# Patient Record
(demographics unavailable — no encounter records)

---

## 2025-03-28 NOTE — PLAN
[Family Questions] : Family's questions were addressed [Sleep] : The importance of sleep and strategies to ensure adequate sleep

## 2025-03-28 NOTE — SOCIAL HISTORY
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

## 2025-03-28 NOTE — HISTORY OF PRESENT ILLNESS
[Public] : Public [Gen Ed: _____] : General Education class [unfilled] [Easily distracted] : easily distracted [Instructions often need to be repeated several times] : instructions often need to be repeated several times [Restless, fidgety] : restless, fidgety [Disruptive in class] : disruptive in class [Calls out in class, doesn't raise hand] : calls out in class, doesn't raise hand [Impatient, has trouble waiting for turn] : impatient, has trouble waiting for turn [Reacts physically when upset] : reacts physically when upset [Difficulty with transitions] : difficulty with transitions [Disrespectful, talks back to adults] : disrespectful, talks back to adults [Behavior difficulties at school and home] : behavior difficulties at school and home [Does well academically] : does well academically [Gets along well with other children] : gets along well with other children [Difficult to discipline] : difficult to discipline [Difficulty focusing in class] : no difficulty focusing in class [Needs frequent redirection to finish tasks] : does not  need frequent redirection to finish tasks [Difficulty completing homework, needs supervision] : no difficulties completing homework, does not need supervision [Difficulty following the daily routines at home] : no difficulties following the daily routines at home [Often loses belongings, misplaces books/assignments] : does not often lose belongings, misplace books and/or assignments  [Difficulty sitting still in class] : no difficulties sitting still in class [Always "on the go"] : not always "on the go" [Easily frustrated] : not easily frustrated [Runs Off] : does not run off [Oppositional] : not oppositional [Has frequent temper tantrums] : does not have frequent temper tantrums [Has hit other children] : has not hit other children [Physically aggressive] : not physically aggressive [Doesn't stay with the group during [place] time] : does stays with the group during [place] time [Difficulty with reading] : no difficulties with reading [Difficulty with math] : no difficulties with math [Handwriting is sloppy or illegible] : handwriting is neat and legible [Trouble expressing thoughts in writing] : no trouble expressing thoughts in writing [Reads well, but has trouble with comprehension] : reads well and comprehends [Is very sensitive, upset easily] : is not very sensitive, does not upset easily [Seems sad often] : does not seem sad often [Keita] : not keita [Seems nervous] : does not seem nervous [Worries about school performance] : does not worry about school performance [Worries what other children think] : does not worry about what other children think [Afraid to speak in front of class] : is not afraid to speak in front of class [Delayed Speech] : no delayed speech [Delays in motor skills] : no delays in motor skills [Difficulty with sleep] : no difficulties with sleep [Picky eater, eats a limited range of food] : not a picky eater, does not eat a very limited range of food [Plays repetitively, has limited interest] : does not play repetitively, does not have limited interests [Flaps hands] : does not flap hands [Becomes fixated on specific topics or interests for a period of time] : does not become fixated on specific topics or interest for a period of time [Insists on things being the same] : does not insist on things being the same [Gets upset with changes in routines] : does not get upset with changes in routines [Gets upset with loud sounds] : does not get upset with loud sounds [Sensitive to texture, only wear certain clothes] : not sensitive to texture, will not only wear certain clothes [Difficulty with bathing] : no difficulties with bathing [difficulty with brushing teeth] : no difficulties with brushing teeth [Difficulty with haircuts] :  no difficulties with haircuts [Difficulty with Toilet training] : no difficulties with toilet training [FreeTextEntry2] : Sometimes he has difficulty focusing in the class. Sometimes he has difficulty following daily routines at home but not often. [FreeTextEntry5] : Sometimes he talks disrespectfully back to adults  sometimes. Sometimes there are behavior difficulties at home and at school. [FreeTextEntry4] : [employer] [TWNoteComboBox1] : 7th Grade

## 2025-03-28 NOTE — REASON FOR VISIT
[Initial Consultation] : an initial consultation for [Hyperactivity] : hyperactivity [Other: ____] : [unfilled] [Mother] : mother [Behavior Problems] : behavior problems [FreeTextEntry2] : Mother believes that he is very talking, impulsive and does not know when to stop. Even his teachers complain that  he talks during lessons and  stop. His behaviors tend to become disruptive in the class. He tends to be disrespective to his teachers. He is currently on a daily behavior monitoring sheet and his mother does not like the daily comments that the teachers make. He is  very smart and intelligent but acts very impulsve. Recently he has been controlling himself but but she is afraid that he may revert back to his prior behaviors, that is the reason his mother brought him in.

## 2025-05-08 NOTE — ASSESSMENT
[FreeTextEntry1] : 12-year-old male with right fibula fracture, possible avulsion fracture of the lateral malleolus.  Patient was placed in a short leg cast and we discussed cast care and elevation.  He will follow-up in 2 weeks for reassessment is aware he needs to refrain from gym or sports.  He will use crutches and father is aware if there is any issue he contact the office.  If he is in pain he will use over-the-counter medication as needed.

## 2025-05-08 NOTE — HISTORY OF PRESENT ILLNESS
[de-identified] : Amanuel is a 12 year old male who presents to the walk in accompanied by his father for evaluation of right leg pain status post injury that occurred 9 days ago.  He states he was playing football when he went up for a pass and was hit in the leg causing him to land awkwardly on his foot.  He felt excruciating pain however as the days went on the pain got better.  He did notice some swelling and then the pain flared up again yesterday where he felt it in both his right ankle and his shin area.  He went to urgent care today where x-rays were taken and revealed a possible avulsion fracture of the lateral malleolus as well as a mildly angulated fibula fracture.  He was placed in a splint and advised to follow-up.  He states he really has not that much pain except if he puts weight on it.  He has not taken any medication.

## 2025-05-08 NOTE — IMAGING
[de-identified] : Right ankle: Mild tenderness palpation over the lateral malleolus, pain with dorsiflexion plantarflexion, no tenderness over the ATFL or PTFL, no ecchymosis noted, neurovascular intact, negative Dinh test.  Right tib-fib: Positive tenderness over the fibula, positive edema noted at the lateral aspect, neurovascularly intact.

## 2025-06-02 NOTE — DATA REVIEWED
[Acceptable Fracture Allignment] : fracture alignment remains acceptable [de-identified] :  3 views of right ankle reviewed.

## 2025-06-02 NOTE — ASSESSMENT
[FreeTextEntry1] : Continue cast. Follow up with AILYN Weston or Helen in 3 weeks for cast off and slow return to normal activity. Follow up with me in 3 months for physeal check.

## 2025-06-02 NOTE — HISTORY OF PRESENT ILLNESS
[FreeTextEntry1] : 11 y/o male here today with fracture of the right fibula about 4 weeks ago after landing awkwardly on the foot while playing football. Patient was last seen by AILYN Syed on 5/8/25 and was placed in a cast.

## 2025-06-02 NOTE — DATA REVIEWED
[Acceptable Fracture Allignment] : fracture alignment remains acceptable [de-identified] :  3 views of right ankle reviewed.

## 2025-06-02 NOTE — HISTORY OF PRESENT ILLNESS
[FreeTextEntry1] : 13 y/o male here today with fracture of the right fibula about 4 weeks ago after landing awkwardly on the foot while playing football. Patient was last seen by AILYN Syed on 5/8/25 and was placed in a cast.